# Patient Record
Sex: FEMALE | Race: AMERICAN INDIAN OR ALASKA NATIVE | NOT HISPANIC OR LATINO | URBAN - METROPOLITAN AREA
[De-identification: names, ages, dates, MRNs, and addresses within clinical notes are randomized per-mention and may not be internally consistent; named-entity substitution may affect disease eponyms.]

---

## 2021-02-28 ENCOUNTER — INPATIENT (INPATIENT)
Facility: HOSPITAL | Age: 56
LOS: 0 days | Discharge: HOME | End: 2021-03-01
Attending: SURGERY | Admitting: SURGERY
Payer: MEDICAID

## 2021-02-28 ENCOUNTER — RESULT REVIEW (OUTPATIENT)
Age: 56
End: 2021-02-28

## 2021-02-28 VITALS
HEART RATE: 102 BPM | WEIGHT: 100.09 LBS | RESPIRATION RATE: 18 BRPM | DIASTOLIC BLOOD PRESSURE: 71 MMHG | TEMPERATURE: 100 F | OXYGEN SATURATION: 100 % | SYSTOLIC BLOOD PRESSURE: 138 MMHG | HEIGHT: 60 IN

## 2021-02-28 LAB
ALBUMIN SERPL ELPH-MCNC: 4.5 G/DL — SIGNIFICANT CHANGE UP (ref 3.5–5.2)
ALP SERPL-CCNC: 72 U/L — SIGNIFICANT CHANGE UP (ref 30–115)
ALT FLD-CCNC: 21 U/L — SIGNIFICANT CHANGE UP (ref 0–41)
ANION GAP SERPL CALC-SCNC: 13 MMOL/L — SIGNIFICANT CHANGE UP (ref 7–14)
APPEARANCE UR: CLEAR — SIGNIFICANT CHANGE UP
AST SERPL-CCNC: 18 U/L — SIGNIFICANT CHANGE UP (ref 0–41)
BACTERIA # UR AUTO: NEGATIVE — SIGNIFICANT CHANGE UP
BASE EXCESS BLDV CALC-SCNC: 4.2 MMOL/L — HIGH (ref -2–2)
BASOPHILS # BLD AUTO: 0.05 K/UL — SIGNIFICANT CHANGE UP (ref 0–0.2)
BASOPHILS NFR BLD AUTO: 0.3 % — SIGNIFICANT CHANGE UP (ref 0–1)
BILIRUB SERPL-MCNC: 0.9 MG/DL — SIGNIFICANT CHANGE UP (ref 0.2–1.2)
BILIRUB UR-MCNC: NEGATIVE — SIGNIFICANT CHANGE UP
BLD GP AB SCN SERPL QL: SIGNIFICANT CHANGE UP
BUN SERPL-MCNC: 10 MG/DL — SIGNIFICANT CHANGE UP (ref 10–20)
CA-I SERPL-SCNC: 1.14 MMOL/L — SIGNIFICANT CHANGE UP (ref 1.12–1.3)
CALCIUM SERPL-MCNC: 9.7 MG/DL — SIGNIFICANT CHANGE UP (ref 8.5–10.1)
CHLORIDE SERPL-SCNC: 95 MMOL/L — LOW (ref 98–110)
CO2 SERPL-SCNC: 27 MMOL/L — SIGNIFICANT CHANGE UP (ref 17–32)
COLOR SPEC: COLORLESS — SIGNIFICANT CHANGE UP
CREAT SERPL-MCNC: 0.7 MG/DL — SIGNIFICANT CHANGE UP (ref 0.7–1.5)
DIFF PNL FLD: ABNORMAL
EOSINOPHIL # BLD AUTO: 0.01 K/UL — SIGNIFICANT CHANGE UP (ref 0–0.7)
EOSINOPHIL NFR BLD AUTO: 0.1 % — SIGNIFICANT CHANGE UP (ref 0–8)
EPI CELLS # UR: 1 /HPF — SIGNIFICANT CHANGE UP (ref 0–5)
GAS PNL BLDV: 140 MMOL/L — SIGNIFICANT CHANGE UP (ref 136–145)
GAS PNL BLDV: SIGNIFICANT CHANGE UP
GLUCOSE SERPL-MCNC: 127 MG/DL — HIGH (ref 70–99)
GLUCOSE UR QL: NEGATIVE — SIGNIFICANT CHANGE UP
HCO3 BLDV-SCNC: 29 MMOL/L — SIGNIFICANT CHANGE UP (ref 22–29)
HCT VFR BLD CALC: 36.5 % — LOW (ref 37–47)
HCT VFR BLDA CALC: 37.4 % — SIGNIFICANT CHANGE UP (ref 34–44)
HGB BLD CALC-MCNC: 12.2 G/DL — LOW (ref 14–18)
HGB BLD-MCNC: 12.8 G/DL — SIGNIFICANT CHANGE UP (ref 12–16)
HYALINE CASTS # UR AUTO: 0 /LPF — SIGNIFICANT CHANGE UP (ref 0–7)
IMM GRANULOCYTES NFR BLD AUTO: 0.5 % — HIGH (ref 0.1–0.3)
KETONES UR-MCNC: NEGATIVE — SIGNIFICANT CHANGE UP
LACTATE BLDV-MCNC: 2.8 MMOL/L — HIGH (ref 0.5–1.6)
LACTATE SERPL-SCNC: 3.9 MMOL/L — HIGH (ref 0.7–2)
LEUKOCYTE ESTERASE UR-ACNC: NEGATIVE — SIGNIFICANT CHANGE UP
LIDOCAIN IGE QN: 28 U/L — SIGNIFICANT CHANGE UP (ref 7–60)
LYMPHOCYTES # BLD AUTO: 1.33 K/UL — SIGNIFICANT CHANGE UP (ref 1.2–3.4)
LYMPHOCYTES # BLD AUTO: 7 % — LOW (ref 20.5–51.1)
MCHC RBC-ENTMCNC: 31.7 PG — HIGH (ref 27–31)
MCHC RBC-ENTMCNC: 35.1 G/DL — SIGNIFICANT CHANGE UP (ref 32–37)
MCV RBC AUTO: 90.3 FL — SIGNIFICANT CHANGE UP (ref 81–99)
MONOCYTES # BLD AUTO: 1.3 K/UL — HIGH (ref 0.1–0.6)
MONOCYTES NFR BLD AUTO: 6.9 % — SIGNIFICANT CHANGE UP (ref 1.7–9.3)
NEUTROPHILS # BLD AUTO: 16.14 K/UL — HIGH (ref 1.4–6.5)
NEUTROPHILS NFR BLD AUTO: 85.2 % — HIGH (ref 42.2–75.2)
NITRITE UR-MCNC: NEGATIVE — SIGNIFICANT CHANGE UP
NRBC # BLD: 0 /100 WBCS — SIGNIFICANT CHANGE UP (ref 0–0)
PCO2 BLDV: 45 MMHG — SIGNIFICANT CHANGE UP (ref 41–51)
PH BLDV: 7.42 — SIGNIFICANT CHANGE UP (ref 7.26–7.43)
PH UR: 7.5 — SIGNIFICANT CHANGE UP (ref 5–8)
PLATELET # BLD AUTO: 276 K/UL — SIGNIFICANT CHANGE UP (ref 130–400)
PO2 BLDV: 18 MMHG — LOW (ref 20–40)
POTASSIUM BLDV-SCNC: 3.7 MMOL/L — SIGNIFICANT CHANGE UP (ref 3.3–5.6)
POTASSIUM SERPL-MCNC: 3.2 MMOL/L — LOW (ref 3.5–5)
POTASSIUM SERPL-SCNC: 3.2 MMOL/L — LOW (ref 3.5–5)
PROT SERPL-MCNC: 7.3 G/DL — SIGNIFICANT CHANGE UP (ref 6–8)
PROT UR-MCNC: NEGATIVE — SIGNIFICANT CHANGE UP
RAPID RVP RESULT: SIGNIFICANT CHANGE UP
RBC # BLD: 4.04 M/UL — LOW (ref 4.2–5.4)
RBC # FLD: 11.7 % — SIGNIFICANT CHANGE UP (ref 11.5–14.5)
RBC CASTS # UR COMP ASSIST: 0 /HPF — SIGNIFICANT CHANGE UP (ref 0–4)
SAO2 % BLDV: 29 % — SIGNIFICANT CHANGE UP
SARS-COV-2 RNA SPEC QL NAA+PROBE: SIGNIFICANT CHANGE UP
SODIUM SERPL-SCNC: 135 MMOL/L — SIGNIFICANT CHANGE UP (ref 135–146)
SP GR SPEC: 1.03 — SIGNIFICANT CHANGE UP (ref 1.01–1.03)
UROBILINOGEN FLD QL: SIGNIFICANT CHANGE UP
WBC # BLD: 18.92 K/UL — HIGH (ref 4.8–10.8)
WBC # FLD AUTO: 18.92 K/UL — HIGH (ref 4.8–10.8)
WBC UR QL: 3 /HPF — SIGNIFICANT CHANGE UP (ref 0–5)

## 2021-02-28 PROCEDURE — 71046 X-RAY EXAM CHEST 2 VIEWS: CPT | Mod: 26

## 2021-02-28 PROCEDURE — 44970 LAPAROSCOPY APPENDECTOMY: CPT

## 2021-02-28 PROCEDURE — 93010 ELECTROCARDIOGRAM REPORT: CPT

## 2021-02-28 PROCEDURE — 99285 EMERGENCY DEPT VISIT HI MDM: CPT

## 2021-02-28 PROCEDURE — 74177 CT ABD & PELVIS W/CONTRAST: CPT | Mod: 26

## 2021-02-28 PROCEDURE — 99222 1ST HOSP IP/OBS MODERATE 55: CPT | Mod: 57

## 2021-02-28 PROCEDURE — 88304 TISSUE EXAM BY PATHOLOGIST: CPT | Mod: 26

## 2021-02-28 RX ORDER — PIPERACILLIN AND TAZOBACTAM 4; .5 G/20ML; G/20ML
3.38 INJECTION, POWDER, LYOPHILIZED, FOR SOLUTION INTRAVENOUS EVERY 8 HOURS
Refills: 0 | Status: DISCONTINUED | OUTPATIENT
Start: 2021-02-28 | End: 2021-02-28

## 2021-02-28 RX ORDER — CEFOTETAN DISODIUM 1 G
2 VIAL (EA) INJECTION ONCE
Refills: 0 | Status: COMPLETED | OUTPATIENT
Start: 2021-02-28 | End: 2021-02-28

## 2021-02-28 RX ORDER — OXYCODONE HYDROCHLORIDE 5 MG/1
5 TABLET ORAL EVERY 6 HOURS
Refills: 0 | Status: DISCONTINUED | OUTPATIENT
Start: 2021-02-28 | End: 2021-03-01

## 2021-02-28 RX ORDER — OXYCODONE HYDROCHLORIDE 5 MG/1
5 TABLET ORAL EVERY 8 HOURS
Refills: 0 | Status: DISCONTINUED | OUTPATIENT
Start: 2021-02-28 | End: 2021-03-01

## 2021-02-28 RX ORDER — SODIUM CHLORIDE 9 MG/ML
1000 INJECTION INTRAMUSCULAR; INTRAVENOUS; SUBCUTANEOUS ONCE
Refills: 0 | Status: COMPLETED | OUTPATIENT
Start: 2021-02-28 | End: 2021-02-28

## 2021-02-28 RX ORDER — PIPERACILLIN AND TAZOBACTAM 4; .5 G/20ML; G/20ML
3.38 INJECTION, POWDER, LYOPHILIZED, FOR SOLUTION INTRAVENOUS ONCE
Refills: 0 | Status: COMPLETED | OUTPATIENT
Start: 2021-02-28 | End: 2021-02-28

## 2021-02-28 RX ORDER — ONDANSETRON 8 MG/1
4 TABLET, FILM COATED ORAL EVERY 6 HOURS
Refills: 0 | Status: DISCONTINUED | OUTPATIENT
Start: 2021-02-28 | End: 2021-03-01

## 2021-02-28 RX ORDER — IBUPROFEN 200 MG
400 TABLET ORAL EVERY 6 HOURS
Refills: 0 | Status: DISCONTINUED | OUTPATIENT
Start: 2021-02-28 | End: 2021-02-28

## 2021-02-28 RX ORDER — HYDROMORPHONE HYDROCHLORIDE 2 MG/ML
0.5 INJECTION INTRAMUSCULAR; INTRAVENOUS; SUBCUTANEOUS
Refills: 0 | Status: DISCONTINUED | OUTPATIENT
Start: 2021-02-28 | End: 2021-02-28

## 2021-02-28 RX ORDER — ONDANSETRON 8 MG/1
4 TABLET, FILM COATED ORAL EVERY 6 HOURS
Refills: 0 | Status: DISCONTINUED | OUTPATIENT
Start: 2021-02-28 | End: 2021-02-28

## 2021-02-28 RX ORDER — SODIUM CHLORIDE 9 MG/ML
1000 INJECTION, SOLUTION INTRAVENOUS
Refills: 0 | Status: DISCONTINUED | OUTPATIENT
Start: 2021-02-28 | End: 2021-02-28

## 2021-02-28 RX ORDER — MORPHINE SULFATE 50 MG/1
4 CAPSULE, EXTENDED RELEASE ORAL
Refills: 0 | Status: DISCONTINUED | OUTPATIENT
Start: 2021-02-28 | End: 2021-02-28

## 2021-02-28 RX ORDER — SODIUM CHLORIDE 9 MG/ML
1000 INJECTION, SOLUTION INTRAVENOUS
Refills: 0 | Status: DISCONTINUED | OUTPATIENT
Start: 2021-02-28 | End: 2021-03-01

## 2021-02-28 RX ORDER — ONDANSETRON 8 MG/1
4 TABLET, FILM COATED ORAL ONCE
Refills: 0 | Status: DISCONTINUED | OUTPATIENT
Start: 2021-02-28 | End: 2021-02-28

## 2021-02-28 RX ORDER — ACETAMINOPHEN 500 MG
650 TABLET ORAL EVERY 6 HOURS
Refills: 0 | Status: DISCONTINUED | OUTPATIENT
Start: 2021-02-28 | End: 2021-02-28

## 2021-02-28 RX ORDER — IBUPROFEN 200 MG
400 TABLET ORAL EVERY 8 HOURS
Refills: 0 | Status: DISCONTINUED | OUTPATIENT
Start: 2021-02-28 | End: 2021-03-01

## 2021-02-28 RX ORDER — POTASSIUM CHLORIDE 20 MEQ
40 PACKET (EA) ORAL ONCE
Refills: 0 | Status: COMPLETED | OUTPATIENT
Start: 2021-02-28 | End: 2021-02-28

## 2021-02-28 RX ORDER — HEPARIN SODIUM 5000 [USP'U]/ML
5000 INJECTION INTRAVENOUS; SUBCUTANEOUS EVERY 8 HOURS
Refills: 0 | Status: DISCONTINUED | OUTPATIENT
Start: 2021-02-28 | End: 2021-03-01

## 2021-02-28 RX ORDER — PANTOPRAZOLE SODIUM 20 MG/1
40 TABLET, DELAYED RELEASE ORAL ONCE
Refills: 0 | Status: COMPLETED | OUTPATIENT
Start: 2021-02-28 | End: 2021-02-28

## 2021-02-28 RX ORDER — PIPERACILLIN AND TAZOBACTAM 4; .5 G/20ML; G/20ML
3.38 INJECTION, POWDER, LYOPHILIZED, FOR SOLUTION INTRAVENOUS ONCE
Refills: 0 | Status: DISCONTINUED | OUTPATIENT
Start: 2021-02-28 | End: 2021-02-28

## 2021-02-28 RX ORDER — CHLORHEXIDINE GLUCONATE 213 G/1000ML
1 SOLUTION TOPICAL
Refills: 0 | Status: DISCONTINUED | OUTPATIENT
Start: 2021-02-28 | End: 2021-03-01

## 2021-02-28 RX ORDER — MORPHINE SULFATE 50 MG/1
2 CAPSULE, EXTENDED RELEASE ORAL EVERY 4 HOURS
Refills: 0 | Status: DISCONTINUED | OUTPATIENT
Start: 2021-02-28 | End: 2021-02-28

## 2021-02-28 RX ORDER — ACETAMINOPHEN 500 MG
650 TABLET ORAL EVERY 6 HOURS
Refills: 0 | Status: DISCONTINUED | OUTPATIENT
Start: 2021-02-28 | End: 2021-03-01

## 2021-02-28 RX ORDER — PIPERACILLIN AND TAZOBACTAM 4; .5 G/20ML; G/20ML
3.38 INJECTION, POWDER, LYOPHILIZED, FOR SOLUTION INTRAVENOUS EVERY 8 HOURS
Refills: 0 | Status: DISCONTINUED | OUTPATIENT
Start: 2021-02-28 | End: 2021-03-01

## 2021-02-28 RX ORDER — HEPARIN SODIUM 5000 [USP'U]/ML
5000 INJECTION INTRAVENOUS; SUBCUTANEOUS EVERY 8 HOURS
Refills: 0 | Status: DISCONTINUED | OUTPATIENT
Start: 2021-02-28 | End: 2021-02-28

## 2021-02-28 RX ORDER — PANTOPRAZOLE SODIUM 20 MG/1
40 TABLET, DELAYED RELEASE ORAL
Refills: 0 | Status: DISCONTINUED | OUTPATIENT
Start: 2021-02-28 | End: 2021-03-01

## 2021-02-28 RX ADMIN — SODIUM CHLORIDE 1000 MILLILITER(S): 9 INJECTION INTRAMUSCULAR; INTRAVENOUS; SUBCUTANEOUS at 08:46

## 2021-02-28 RX ADMIN — HEPARIN SODIUM 5000 UNIT(S): 5000 INJECTION INTRAVENOUS; SUBCUTANEOUS at 21:50

## 2021-02-28 RX ADMIN — PANTOPRAZOLE SODIUM 40 MILLIGRAM(S): 20 TABLET, DELAYED RELEASE ORAL at 16:45

## 2021-02-28 RX ADMIN — PIPERACILLIN AND TAZOBACTAM 25 GRAM(S): 4; .5 INJECTION, POWDER, LYOPHILIZED, FOR SOLUTION INTRAVENOUS at 22:25

## 2021-02-28 RX ADMIN — SODIUM CHLORIDE 125 MILLILITER(S): 9 INJECTION, SOLUTION INTRAVENOUS at 15:31

## 2021-02-28 RX ADMIN — SODIUM CHLORIDE 100 MILLILITER(S): 9 INJECTION, SOLUTION INTRAVENOUS at 22:23

## 2021-02-28 RX ADMIN — SODIUM CHLORIDE 1000 MILLILITER(S): 9 INJECTION INTRAMUSCULAR; INTRAVENOUS; SUBCUTANEOUS at 10:02

## 2021-02-28 RX ADMIN — Medication 40 MILLIEQUIVALENT(S): at 11:36

## 2021-02-28 RX ADMIN — Medication 100 GRAM(S): at 11:36

## 2021-02-28 RX ADMIN — Medication 650 MILLIGRAM(S): at 15:58

## 2021-02-28 RX ADMIN — PIPERACILLIN AND TAZOBACTAM 200 GRAM(S): 4; .5 INJECTION, POWDER, LYOPHILIZED, FOR SOLUTION INTRAVENOUS at 15:31

## 2021-02-28 RX ADMIN — MORPHINE SULFATE 4 MILLIGRAM(S): 50 CAPSULE, EXTENDED RELEASE ORAL at 20:40

## 2021-02-28 NOTE — H&P ADULT - ASSESSMENT
Patient is a 54 y/o female whom denies prior PMHx nor past surgical history who presents with complaints of abdominal pain. Patient found to have a tender abdomen in the lower quadrants on exam, along with a WBC of 18, and a CT scan with a notable dilated Appendix with associated fat stranding. Diagnosis of Appendicitis would be consistent with stating the above. Consented patient in the ED for Laparoscopic Appendectomy with possible conversion to open in which risk including bleeding and infection discussed. Patient requested consent via her  [facility] ( [phone]). Patient added to OR today      Appendicitis   - Zosyn ordered  - Booked for OR today- consent in chart  - Maintain NPO  - LR ordered  - Tylenol and morphine ordered for post-op pain  - Zofran ordered for post o nausea  - GI PPX with Pantoprazole  - DVT PPX with Subq Heparin post op

## 2021-02-28 NOTE — PRE-OP CHECKLIST - COMMENTS
las ate or drank 2/27 2000 a glass of milk las ate or drank 2/27 2000 a glass of milk/ HAD BREAKFAST 0800 AS PER PT 2/28

## 2021-02-28 NOTE — BRIEF OPERATIVE NOTE - NSICDXBRIEFPOSTOP_GEN_ALL_CORE_FT
POST-OP DIAGNOSIS:  Acute gangrenous appendicitis with localized peritonitis, without perforation 28-Feb-2021 20:42:16  Chidi Penny

## 2021-02-28 NOTE — H&P ADULT - NSHPPHYSICALEXAM_GEN_ALL_CORE
Vital Signs  T(F): 99.5 (28 Feb 2021 08:16), Max: 99.5 (28 Feb 2021 08:16)  HR: 102 (28 Feb 2021 08:16) (102 - 102)  BP: 138/71 (28 Feb 2021 08:16) (138/71 - 138/71)  RR: 18 (28 Feb 2021 08:16) (18 - 18)  SpO2: 100% (28 Feb 2021 08:16) (100% - 100%)  Physical Exam  Gen: NAD  HEENT: NC/AT, Mucosal Membranes Dry  Cardio: S1/S2   Resp: CTA B/L  Abdomen: NF, TTP on light palpation over right lower quadrant/ supra pubic area, and right lower quadrant   Neuro: AAOx3  Extremities: FROM x 4

## 2021-02-28 NOTE — H&P ADULT - NSHPLABSRESULTS_GEN_ALL_CORE
12.8   18.92 )-----------( 276      ( 28 Feb 2021 08:40 )             36.5     02-28    135  |  95<L>  |  10  ----------------------------<  127<H>  3.2<L>   |  27  |  0.7    Ca    9.7      28 Feb 2021 08:40    TPro  7.3  /  Alb  4.5  /  TBili  0.9  /  DBili  x   /  AST  18  /  ALT  21  /  AlkPhos  72  02-28

## 2021-02-28 NOTE — CONSULT NOTE ADULT - ASSESSMENT
Patient is a 56 y/o female whom denies prior PMHx nor past surgical history who presents with complaints of abdominal pain. Patient found to have a tender abdomen in the lower quadrants on exam, along with a WBC of 18, and a CT scan with a notable dilated Appendix with associated fat stranding. Diagnosis of Appendicitis would be consistent with stating the above. Patient given PO potassium by ED at 40meq and Cefotetan ordered. Patient to remain NPO for now. Patient will need blood cultures, coags,TnS,  Covid testing, UA with U-preg, chest xray, and EKG as may need to go to OR today for appendectomy.    Appendicitis   - Blood Cultures needed  - Covid swab- Rapid  - UA with U preg  - Needs TnS with Coags  - PO K given by ED as K was 3.2  - EKG and chest xray needed  - Dry on exam would give 2 liter of LR ( No Card Hx)  - Given Cefotetan- if remains on this will add flagyl , or can just give Zosyn  - NPO for now as may needed to be added on for Appendectomy    - Will follow

## 2021-02-28 NOTE — H&P ADULT - NSHPREVIEWOFSYSTEMS_GEN_ALL_CORE
Review of Systems  General: See HPI  HEENT: Denies Trouble Swallowing ,Denies  Sore Throat , Denies Change in hearing/vision/speech ,Denies Dizziness    Cardio: Denies  Chest Pain , Palpitations    Respiratory: Denies worsening of SOB, Denies Cough  Abdomen: See detailed HPI  Neuro: Denies Headache Denies Dizziness, Denies Paresthesias  MSK: Denies pain in Bones/Joints/Muscles   Psych: Patient denies depression, denies suicidal or homicidal ideations  Integ: Patient Denies rash, or new skin lesions

## 2021-02-28 NOTE — BRIEF OPERATIVE NOTE - NSICDXBRIEFPREOP_GEN_ALL_CORE_FT
PRE-OP DIAGNOSIS:  Acute appendicitis with localized peritonitis 28-Feb-2021 20:41:58  Chidi Penny

## 2021-02-28 NOTE — ED PROVIDER NOTE - OBJECTIVE STATEMENT
56 y/o F, no significant PMHx, presents to the ED with complaints of abdominal pain x two days. She admits to lower abdominal pain 54 y/o F, no significant PMHx, presents to the ED with complaints of abdominal pain x two days. She admits to lower abdominal pain that began yesterday after work with associated nausea and one episode of emesis; denies diarrhea, fever, chills, chest pain, dyspnea and urinary symptoms. She denies prior abdominal surgery. Her LMP was three years ago.

## 2021-02-28 NOTE — PROVIDER CONTACT NOTE (OTHER) - BACKGROUND
As per LIYAH Burroughs from preop. anesthiology was notified of fever and states expected for pt to have fever with admitted dx.

## 2021-02-28 NOTE — PROVIDER CONTACT NOTE (OTHER) - SITUATION
Pt febrile, temp 101.5 despite given tylenol in ED@1558pm  Pt arrived to floor around 1615  Spoke to preop RN Nerissa as well who stated OR will take pt despite fever

## 2021-02-28 NOTE — CHART NOTE - NSCHARTNOTEFT_GEN_A_CORE
Post Operative Check    Patient is post op from a Laparoscopic appendectomy [61212]    patient tolerated the procedure and downgraded to floor     Vitals    T(C): 36.4 (21 @ 20:36), Max: 38.8 (21 @ 17:58)  HR: 72 (21 @ 21:15) (72 - 102)  BP: 103/64 (21 @ 21:15) (96/53 - 138/71)  RR: 15 (21 @ 21:15) (12 - 19)  SpO2: 97% (21 @ 21:15) (97% - 100%)  Wt(kg): --       @ 07:01  -   @ 21:46  --------------------------------------------------------  IN:    Lactated Ringers: 250 mL  Total IN: 250 mL    OUT:  Total OUT: 0 mL    Total NET: 250 mL          Physical Exam  General: NAD AAOx3   Cards: RRR S1S2  Resp: CTAB  Abdomen: dressing is clean and dry , mild tender abd , non distended   Labs  Labs:  CAPILLARY BLOOD GLUCOSE                              12.8   18.92 )-----------( 276      ( 2021 08:40 )             36.5       Auto Neutrophil %: 85.2 % (21 @ 08:40)  Auto Immature Granulocyte %: 0.5 % (21 @ 08:40)        135  |  95<L>  |  10  ----------------------------<  127<H>  3.2<L>   |  27  |  0.7      Calcium, Total Serum: 9.7 mg/dL (21 @ 08:40)      LFTs:             7.3  | 0.9  | 18       ------------------[72      ( 2021 08:40 )  4.5  | x    | 21          Lipase:28     Amylase:x         Blood Gas Venous - Lactate: 2.8 mmoL/L (21 @ 11:11)  Lactate, Blood: 3.9 mmol/L (21 @ 08:40)      Coags:            Urinalysis Basic - ( 2021 08:37 )    Color: Colorless / Appearance: Clear / S.030 / pH: x  Gluc: x / Ketone: Negative  / Bili: Negative / Urobili: <2 mg/dL   Blood: x / Protein: Negative / Nitrite: Negative   Leuk Esterase: Negative / RBC: 0 /HPF / WBC 3 /HPF   Sq Epi: x / Non Sq Epi: 1 /HPF / Bacteria: Negative            Radiology:       Patient is a 55y old Female s/p lap appendectomy   Plan:  regular diet   pain control   out of bed   fu dispo plan

## 2021-02-28 NOTE — CONSULT NOTE ADULT - SUBJECTIVE AND OBJECTIVE BOX
Patient is a 56 y/o female whom denies prior PMHx nor past surgical history who presents with complaints of abdominal pain. Patient notes she has been feeling well until yesterday. She noted pain that started earlier in the day and progressively became worse. Pain in right lower quadrant and radiates across to left lower abdomen. Pain is sharp, constant, and with no alleviating factors at home. She notes lat bowel movement was 24 hours prior and was normal. She notes she is passing gas and notes last night she only had a glass of milk for her meal. She has not had anything further to eat or drink. She notes she feels like she has the chills and cannot get comfortable. She feels best when she is laying flat. Denies prior Colonoscopy.     PAST MEDICAL & SURGICAL HISTORY:  Denies    Social History  Denies Current Tobacco use  Denies Current ETOH use  Denies Current Illicit Drug use     Denies Home Medications    MEDICATIONS  (STANDING):  cefoTEtan  IVPB 2 Gram(s) IV Intermittent Once  potassium chloride    Tablet ER 40 milliEquivalent(s) Oral once        Allergies  No Known Allergies      Review of Systems  General: See HPI  HEENT: Denies Trouble Swallowing ,Denies  Sore Throat , Denies Change in hearing/vision/speech ,Denies Dizziness    Cardio: Denies  Chest Pain , Palpitations    Respiratory: Denies worsening of SOB, Denies Cough  Abdomen: See detailed HPI  Neuro: Denies Headache Denies Dizziness, Denies Paresthesias  MSK: Denies pain in Bones/Joints/Muscles   Psych: Patient denies depression, denies suicidal or homicidal ideations  Integ: Patient Denies rash, or new skin lesions       Vital Signs  T(F): 99.5 (28 Feb 2021 08:16), Max: 99.5 (28 Feb 2021 08:16)  HR: 102 (28 Feb 2021 08:16) (102 - 102)  BP: 138/71 (28 Feb 2021 08:16) (138/71 - 138/71)  RR: 18 (28 Feb 2021 08:16) (18 - 18)  SpO2: 100% (28 Feb 2021 08:16) (100% - 100%)  Physical Exam  Gen: NAD  HEENT: NC/AT, Mucosal Membranes Dry  Cardio: S1/S2   Resp: CTA B/L  Abdomen: NF, TTP on light palpation over right lower wuadrant/ supra pubic area, and right lower quadrant   Neuro: AAOx3  Extremities: FROM x 4      LABS:                            12.8   18.92 )-----------( 276      ( 28 Feb 2021 08:40 )             36.5       Auto Neutrophil %: 85.2 % (02-28-21 @ 08:40)  Auto Immature Granulocyte %: 0.5 % (02-28-21 @ 08:40)    02-28    135  |  95<L>  |  10  ----------------------------<  127<H>  3.2<L>   |  27  |  0.7      Calcium, Total Serum: 9.7 mg/dL (02-28-21 @ 08:40)      LFTs:             7.3  | 0.9  | 18       ------------------[72      ( 28 Feb 2021 08:40 )  4.5  | x    | 21          Lipase:28         Lactate, Blood: 3.9 mmol/L (02-28-21 @ 08:40)    RADIOLOGY & ADDITIONAL STUDIES:  CT Abdomen and Pelvis w/ IV Cont 02.28.21   IMPRESSION:    Dilated appendix with mild surrounding fat stranding, suggestive of acute appendicitis. No adjacent focal drainable fluid collection or free air.

## 2021-02-28 NOTE — ED PROVIDER NOTE - ATTENDING CONTRIBUTION TO CARE
55F no pmh/psh, p/w 1 day abd pain. epigastric squeezing constant radiates diffusely.  nbnb emesis x1  associated. no fever, chills.  no d/c. no dysuria, freq, hematuria. no cp, sob. lmp 3 yrs ago. no cough, uri.     on exam, AFVSS, well marlen nad, ncat, eomi, perrla, mmm, lctab, rrr nl s1s2 no mrg, abd soft +RLQ ttp, no rebound or rigidity, no cvat, nd, aaox3, no focal deficits, no le edema or calf ttp,     a/p; concern for appendicitis, will do labs, ua, ct, ivf, pain control, npo, re-eval

## 2021-02-28 NOTE — H&P ADULT - ATTENDING COMMENTS
56 y/o female complains of RLQ abdominal pain  since last night.  Had episode of nausea and vomiting.    PE:  AAO x3  Chest: clear.  CV : RRR  Abdomen: tender in the lower abdomen and RLQ with +rebound.    WBC 18.  .min  LA 3.9  CT - acute appendicitis    ASSESSMENT:  56 y/o female with Acute Appendicitis with localized peritonitis  Sepsis present on admission.  Lactic acidosis    PLAN:  - NPO, IVF  - iv Zosyn  Will book for laparoscopic appendectomy, possible open    Informed consent was obtained from the patient after explaining all the risks and benefits of it including but not limited to infection, bleeding and etc.  She understood and agreed.

## 2021-03-01 ENCOUNTER — TRANSCRIPTION ENCOUNTER (OUTPATIENT)
Age: 56
End: 2021-03-01

## 2021-03-01 VITALS
HEART RATE: 74 BPM | DIASTOLIC BLOOD PRESSURE: 56 MMHG | SYSTOLIC BLOOD PRESSURE: 101 MMHG | RESPIRATION RATE: 18 BRPM | TEMPERATURE: 100 F

## 2021-03-01 LAB
ANION GAP SERPL CALC-SCNC: 12 MMOL/L — SIGNIFICANT CHANGE UP (ref 7–14)
BASOPHILS # BLD AUTO: 0.02 K/UL — SIGNIFICANT CHANGE UP (ref 0–0.2)
BASOPHILS NFR BLD AUTO: 0.1 % — SIGNIFICANT CHANGE UP (ref 0–1)
BUN SERPL-MCNC: 7 MG/DL — LOW (ref 10–20)
CALCIUM SERPL-MCNC: 8.7 MG/DL — SIGNIFICANT CHANGE UP (ref 8.5–10.1)
CHLORIDE SERPL-SCNC: 105 MMOL/L — SIGNIFICANT CHANGE UP (ref 98–110)
CO2 SERPL-SCNC: 25 MMOL/L — SIGNIFICANT CHANGE UP (ref 17–32)
CREAT SERPL-MCNC: 0.6 MG/DL — LOW (ref 0.7–1.5)
EOSINOPHIL # BLD AUTO: 0 K/UL — SIGNIFICANT CHANGE UP (ref 0–0.7)
EOSINOPHIL NFR BLD AUTO: 0 % — SIGNIFICANT CHANGE UP (ref 0–8)
GLUCOSE SERPL-MCNC: 136 MG/DL — HIGH (ref 70–99)
HCT VFR BLD CALC: 33.3 % — LOW (ref 37–47)
HGB BLD-MCNC: 11 G/DL — LOW (ref 12–16)
IMM GRANULOCYTES NFR BLD AUTO: 0.7 % — HIGH (ref 0.1–0.3)
LYMPHOCYTES # BLD AUTO: 1.31 K/UL — SIGNIFICANT CHANGE UP (ref 1.2–3.4)
LYMPHOCYTES # BLD AUTO: 7.1 % — LOW (ref 20.5–51.1)
MAGNESIUM SERPL-MCNC: 2 MG/DL — SIGNIFICANT CHANGE UP (ref 1.8–2.4)
MCHC RBC-ENTMCNC: 31.1 PG — HIGH (ref 27–31)
MCHC RBC-ENTMCNC: 33 G/DL — SIGNIFICANT CHANGE UP (ref 32–37)
MCV RBC AUTO: 94.1 FL — SIGNIFICANT CHANGE UP (ref 81–99)
MONOCYTES # BLD AUTO: 0.75 K/UL — HIGH (ref 0.1–0.6)
MONOCYTES NFR BLD AUTO: 4.1 % — SIGNIFICANT CHANGE UP (ref 1.7–9.3)
NEUTROPHILS # BLD AUTO: 16.14 K/UL — HIGH (ref 1.4–6.5)
NEUTROPHILS NFR BLD AUTO: 88 % — HIGH (ref 42.2–75.2)
NRBC # BLD: 0 /100 WBCS — SIGNIFICANT CHANGE UP (ref 0–0)
PHOSPHATE SERPL-MCNC: 3.5 MG/DL — SIGNIFICANT CHANGE UP (ref 2.1–4.9)
PLATELET # BLD AUTO: 229 K/UL — SIGNIFICANT CHANGE UP (ref 130–400)
POTASSIUM SERPL-MCNC: 4 MMOL/L — SIGNIFICANT CHANGE UP (ref 3.5–5)
POTASSIUM SERPL-SCNC: 4 MMOL/L — SIGNIFICANT CHANGE UP (ref 3.5–5)
RBC # BLD: 3.54 M/UL — LOW (ref 4.2–5.4)
RBC # FLD: 12 % — SIGNIFICANT CHANGE UP (ref 11.5–14.5)
SODIUM SERPL-SCNC: 142 MMOL/L — SIGNIFICANT CHANGE UP (ref 135–146)
WBC # BLD: 18.35 K/UL — HIGH (ref 4.8–10.8)
WBC # FLD AUTO: 18.35 K/UL — HIGH (ref 4.8–10.8)

## 2021-03-01 PROCEDURE — 99024 POSTOP FOLLOW-UP VISIT: CPT

## 2021-03-01 RX ORDER — IBUPROFEN 200 MG
1 TABLET ORAL
Qty: 0 | Refills: 0 | DISCHARGE
Start: 2021-03-01

## 2021-03-01 RX ORDER — OXYCODONE HYDROCHLORIDE 5 MG/1
1 TABLET ORAL
Qty: 5 | Refills: 0
Start: 2021-03-01

## 2021-03-01 RX ORDER — ACETAMINOPHEN 500 MG
2 TABLET ORAL
Qty: 0 | Refills: 0 | DISCHARGE
Start: 2021-03-01

## 2021-03-01 RX ADMIN — Medication 400 MILLIGRAM(S): at 14:54

## 2021-03-01 RX ADMIN — PANTOPRAZOLE SODIUM 40 MILLIGRAM(S): 20 TABLET, DELAYED RELEASE ORAL at 05:08

## 2021-03-01 RX ADMIN — HEPARIN SODIUM 5000 UNIT(S): 5000 INJECTION INTRAVENOUS; SUBCUTANEOUS at 05:08

## 2021-03-01 RX ADMIN — PIPERACILLIN AND TAZOBACTAM 25 GRAM(S): 4; .5 INJECTION, POWDER, LYOPHILIZED, FOR SOLUTION INTRAVENOUS at 05:08

## 2021-03-01 NOTE — PHYSICAL THERAPY INITIAL EVALUATION ADULT - GENERAL OBSERVATIONS, REHAB EVAL
8:16-8:42.   Chart reviewed. Pt. in bed, A & O x 4. + IV. Agreeable for P.T.  Eval/assessm done- see note. Nola. well. very high mobility level. Ret. to bed after tx. Informed LIYAH Rios.

## 2021-03-01 NOTE — DISCHARGE NOTE NURSING/CASE MANAGEMENT/SOCIAL WORK - PATIENT PORTAL LINK FT
You can access the FollowMyHealth Patient Portal offered by [facility] by registering at the following website: [email or web address]. By joining [facility]’s FollowMyHealth portal, you will also be able to view your health information using other applications (apps) compatible with our system.

## 2021-03-01 NOTE — DISCHARGE NOTE PROVIDER - HOSPITAL COURSE
Patient is a 54 y/o female whom denies prior PMHx nor past surgical history who presents with complaints of abdominal pain. Patient notes she has been feeling well until yesterday. She noted pain that started earlier in the day and progressively became worse. Pain in right lower quadrant and radiates across to left lower abdomen. Pain is sharp, constant, and with no alleviating factors at home. She notes lat bowel movement was 24 hours prior and was normal. She notes she is passing gas and notes last night she only had a glass of milk for her meal. She has not had anything further to eat or drink. She notes she feels like she has the chills and cannot get comfortable. She feels best when she is laying flat. Denies prior Colonoscopy. Work up in ED revealed acute appendicitis. The patient was taken to the OR for a laparoscopic appendectomy, in OR, a gangrenous appendix was removed without complication. Post operatively, the patient ambulated, tolerated diet and urinated. The patient will be discharged on 5 days of augmentin and the appropriate pain regimen. The patient will follow up with Dr. Giron in 2 weeks.

## 2021-03-01 NOTE — DISCHARGE NOTE PROVIDER - NSDCMRMEDTOKEN_GEN_ALL_CORE_FT
acetaminophen 325 mg oral tablet: 2 tab(s) orally every 6 hours  Augmentin 875 mg-125 mg oral tablet: 1 tab(s) orally every 12 hours   ibuprofen 400 mg oral tablet: 1 tab(s) orally every 8 hours  oxyCODONE 5 mg oral tablet: 1 tab(s) orally every 6 hours, As Needed - - for severe pain MDD:4

## 2021-03-01 NOTE — DISCHARGE NOTE PROVIDER - CARE PROVIDER_API CALL
Christel Giron)  Surgery; Surgical Critical Care  [address] [place] [address]  Phone: [phone]  Fax: [phone]  Follow Up Time: 2 weeks

## 2021-03-01 NOTE — PROGRESS NOTE ADULT - SUBJECTIVE AND OBJECTIVE BOX
GENERAL SURGERY PROGRESS NOTE     YASMIN PAREDES  55y  Female  Hospital day :1d  POD:  Procedure: Laparoscopic appendectomy      OVERNIGHT EVENTS: Nola procedure well. getting zosyn. No other complaints    T(F): 97.4 (21 @ 04:30), Max: 101.9 (21 @ 17:58)  HR: 73 (21 @ 04:30) (69 - 102)  BP: 96/53 (21 @ 04:30) (96/53 - 138/71)  ABP: --  ABP(mean): --  RR: 18 (21 @ 04:30) (12 - 19)  SpO2: 97% (21 @ 21:30) (97% - 100%)    DIET/FLUIDS: lactated ringers. 1000 milliLiter(s) IV Continuous <Continuous>    NG:                                                                                DRAINS:     BM:     EMESIS:     URINE:      GI proph:  pantoprazole    Tablet 40 milliGRAM(s) Oral before breakfast    AC/ proph: heparin   Injectable 5000 Unit(s) SubCutaneous every 8 hours    ABx: piperacillin/tazobactam IVPB.. 3.375 Gram(s) IV Intermittent every 8 hours      PHYSICAL EXAM:  GENERAL: NAD, well-appearing  CHEST/LUNG: Clear to auscultation bilaterally  HEART: Regular rate and rhythm  ABDOMEN: Soft, Nontender, Nondistended; dressing clean, dry  EXTREMITIES:  No clubbing, cyanosis, or edema      LABS  Labs:  CAPILLARY BLOOD GLUCOSE                              12.8   18.92 )-----------( 276      ( 2021 08:40 )             36.5       Auto Neutrophil %: 85.2 % (21 @ 08:40)  Auto Immature Granulocyte %: 0.5 % (21 @ 08:40)        142  |  105  |  7<L>  ----------------------------<  136<H>  4.0   |  25  |  0.6<L>      Calcium, Total Serum: 8.7 mg/dL (21 @ 05:42)      LFTs:             7.3  | 0.9  | 18       ------------------[72      ( 2021 08:40 )  4.5  | x    | 21          Lipase:28     Amylase:x         Blood Gas Venous - Lactate: 2.8 mmoL/L (21 @ 11:11)  Lactate, Blood: 3.9 mmol/L (21 @ 08:40)      Coags:            Urinalysis Basic - ( 2021 08:37 )    Color: Colorless / Appearance: Clear / S.030 / pH: x  Gluc: x / Ketone: Negative  / Bili: Negative / Urobili: <2 mg/dL   Blood: x / Protein: Negative / Nitrite: Negative   Leuk Esterase: Negative / RBC: 0 /HPF / WBC 3 /HPF   Sq Epi: x / Non Sq Epi: 1 /HPF / Bacteria: Negative            RADIOLOGY & ADDITIONAL TESTS:

## 2021-03-01 NOTE — DISCHARGE NOTE PROVIDER - NSDCCPCAREPLAN_GEN_ALL_CORE_FT
PRINCIPAL DISCHARGE DIAGNOSIS  Diagnosis: Appendicitis  Assessment and Plan of Treatment: Continue regular diet.  Dressings : Remove outside dressing in 2 days, OK to shower normally. Leave steri-strips in place, they will fall off on their own in 1 week.  Pain : Take ibuprofen, tylenol around the clock (every 8 hours) for at least three days. Take oxycodone 5mg as needed for breakthrough pain. Please be aware, the medication can cause drowsiness, so reserve for night time use.   Antibiotics : Please complete 5 day course of augmentin, 1 tablet twice daily by mouth.  Activity : Please avoid heavy lifting (anything over 10 pounds) for at least 6 weeks.   Follow up : Call to schedule a follow up appointment in 2 weeks, [phone]

## 2021-03-02 LAB — SURGICAL PATHOLOGY STUDY: SIGNIFICANT CHANGE UP

## 2021-03-14 DIAGNOSIS — K35.31 ACUTE APPENDICITIS WITH LOCALIZED PERITONITIS AND GANGRENE, WITHOUT PERFORATION: ICD-10-CM

## 2021-03-14 DIAGNOSIS — E87.2 ACIDOSIS: ICD-10-CM

## 2021-03-14 DIAGNOSIS — A41.9 SEPSIS, UNSPECIFIED ORGANISM: ICD-10-CM

## 2021-03-15 PROBLEM — Z78.9 OTHER SPECIFIED HEALTH STATUS: Chronic | Status: ACTIVE | Noted: 2021-02-28

## 2021-03-23 ENCOUNTER — APPOINTMENT (OUTPATIENT)
Dept: SURGERY | Facility: CLINIC | Age: 56
End: 2021-03-23
Payer: MEDICAID

## 2021-03-23 VITALS
BODY MASS INDEX: 20.15 KG/M2 | WEIGHT: 96 LBS | HEART RATE: 85 BPM | DIASTOLIC BLOOD PRESSURE: 78 MMHG | SYSTOLIC BLOOD PRESSURE: 130 MMHG | HEIGHT: 58 IN | TEMPERATURE: 97.7 F

## 2021-03-23 PROBLEM — Z00.00 ENCOUNTER FOR PREVENTIVE HEALTH EXAMINATION: Status: ACTIVE | Noted: 2021-03-23

## 2021-03-23 PROCEDURE — 99024 POSTOP FOLLOW-UP VISIT: CPT

## 2021-04-05 NOTE — PHYSICAL EXAM
[JVD] : no jugular venous distention  [Normal Thyroid] : the thyroid was normal [Normal Breath Sounds] : Normal breath sounds [Normal Heart Sounds] : normal heart sounds [Abdominal Masses] : No abdominal masses [Abdomen Tenderness] : ~T ~M No abdominal tenderness [Tender] : was nontender [Enlarged] : not enlarged [No Rash or Lesion] : No rash or lesion [Alert] : alert [Oriented to Person] : oriented to person [Oriented to Place] : oriented to place [Oriented to Time] : oriented to time [Calm] : calm [de-identified] : comfortable [de-identified] : [place] [de-identified] : supple [de-identified] : +BS, soft, nontender [de-identified] : wnl

## 2021-04-05 NOTE — HISTORY OF PRESENT ILLNESS
[de-identified] : 56 y/o female had laparoscopic appendectomy on 2/28/21 for acute appendicitis. [de-identified] : Patient doing well.\par Tolerates diet.\par Has regular bowel movements

## 2023-05-01 NOTE — ED ADULT TRIAGE NOTE - PAIN: PRESENCE, MLM
Your testing in the emergency department did not determine a cause of your pain. Take Pepcid once daily. Take Bentyl as needed for pain. Take Phenergan as needed for nausea. Follow-up with a primary care provider of your choice for repeat evaluation and further testing if your symptoms continue.
complains of pain/discomfort

## 2024-07-23 NOTE — ED PROVIDER NOTE - CADM POA URETHRAL CATHETER
Quality 431: Preventive Care And Screening: Unhealthy Alcohol Use - Screening: Patient not identified as an unhealthy alcohol user when screened for unhealthy alcohol use using a systematic screening method Quality 226: Preventive Care And Screening: Tobacco Use: Screening And Cessation Intervention: Patient screened for tobacco use and is an ex/non-smoker Detail Level: Detailed Quality 130: Documentation Of Current Medications In The Medical Record: Current Medications Documented No